# Patient Record
Sex: MALE | Race: WHITE | NOT HISPANIC OR LATINO | Employment: UNEMPLOYED | URBAN - NONMETROPOLITAN AREA
[De-identification: names, ages, dates, MRNs, and addresses within clinical notes are randomized per-mention and may not be internally consistent; named-entity substitution may affect disease eponyms.]

---

## 2017-02-06 ENCOUNTER — TRANSCRIBE ORDERS (OUTPATIENT)
Dept: ADMINISTRATIVE | Facility: HOSPITAL | Age: 67
End: 2017-02-06

## 2017-02-06 DIAGNOSIS — IMO0002 UNCONTROLLED TYPE 2 DIABETES WITH NEUROPATHY: Primary | ICD-10-CM

## 2018-09-13 ENCOUNTER — TRANSCRIBE ORDERS (OUTPATIENT)
Dept: ADMINISTRATIVE | Facility: HOSPITAL | Age: 68
End: 2018-09-13

## 2018-09-13 DIAGNOSIS — M51.15 INTERVERTEBRAL DISC DISORDER WITH RADICULOPATHY OF THORACOLUMBAR REGION: ICD-10-CM

## 2018-09-13 DIAGNOSIS — M51.35 DEGENERATION OF THORACOLUMBAR INTERVERTEBRAL DISC: Primary | ICD-10-CM

## 2018-09-13 DIAGNOSIS — G89.29 CHRONIC RIGHT SHOULDER PAIN: ICD-10-CM

## 2018-09-13 DIAGNOSIS — M25.511 CHRONIC RIGHT SHOULDER PAIN: ICD-10-CM

## 2021-10-05 DIAGNOSIS — M25.512 LEFT SHOULDER PAIN, UNSPECIFIED CHRONICITY: Primary | ICD-10-CM

## 2021-10-25 ENCOUNTER — OFFICE VISIT (OUTPATIENT)
Dept: ORTHOPEDIC SURGERY | Facility: CLINIC | Age: 71
End: 2021-10-25

## 2021-10-25 VITALS
WEIGHT: 315 LBS | BODY MASS INDEX: 49.44 KG/M2 | SYSTOLIC BLOOD PRESSURE: 126 MMHG | HEIGHT: 67 IN | DIASTOLIC BLOOD PRESSURE: 56 MMHG | HEART RATE: 62 BPM

## 2021-10-25 DIAGNOSIS — M19.012 OSTEOARTHRITIS OF GLENOHUMERAL JOINT, LEFT: Primary | ICD-10-CM

## 2021-10-25 PROCEDURE — 20610 DRAIN/INJ JOINT/BURSA W/O US: CPT | Performed by: ORTHOPAEDIC SURGERY

## 2021-10-25 PROCEDURE — 99203 OFFICE O/P NEW LOW 30 MIN: CPT | Performed by: ORTHOPAEDIC SURGERY

## 2021-10-25 RX ORDER — CLONIDINE HYDROCHLORIDE 0.2 MG/1
TABLET ORAL 3 TIMES DAILY
COMMUNITY
Start: 2013-07-03

## 2021-10-25 RX ORDER — HYDROCODONE BITARTRATE AND ACETAMINOPHEN 10; 325 MG/1; MG/1
1 TABLET ORAL EVERY 6 HOURS PRN
COMMUNITY

## 2021-10-25 RX ORDER — LANOLIN ALCOHOL/MO/W.PET/CERES
1000 CREAM (GRAM) TOPICAL DAILY
COMMUNITY

## 2021-10-25 RX ORDER — AZELASTINE HYDROCHLORIDE 137 UG/1
SPRAY, METERED NASAL
COMMUNITY
Start: 2021-10-05

## 2021-10-25 RX ORDER — POTASSIUM CHLORIDE 750 MG/1
10 TABLET, FILM COATED, EXTENDED RELEASE ORAL DAILY
COMMUNITY
Start: 2021-10-05

## 2021-10-25 RX ORDER — ERGOCALCIFEROL 1.25 MG/1
50000 CAPSULE ORAL WEEKLY
COMMUNITY
Start: 2021-10-05

## 2021-10-25 RX ORDER — LISINOPRIL 20 MG/1
1 TABLET ORAL
COMMUNITY
Start: 2013-07-31

## 2021-10-25 RX ORDER — INSULIN DETEMIR 100 [IU]/ML
INJECTION, SOLUTION SUBCUTANEOUS
COMMUNITY
Start: 2021-09-30

## 2021-10-25 RX ORDER — LEVOTHYROXINE SODIUM 0.07 MG/1
TABLET ORAL
COMMUNITY
Start: 2015-01-13

## 2021-10-25 RX ORDER — FOLIC ACID 1 MG/1
1000 TABLET ORAL DAILY
COMMUNITY
Start: 2021-10-05

## 2021-10-25 NOTE — PROGRESS NOTES
New Patient Visit      Patient: Inocencio Del Cid  YOB: 1950  Date of Encounter: 10/25/2021        Chief Complaint:   Chief Complaint   Patient presents with   • Left Shoulder - Pain, Initial Evaluation           HPI:   Inocencio Del Cid, 71 y.o. male, referred by Sobia Youssef APRN presents left shoulder complaints of about 6 months duration no injury recent or remote.  He reports he has difficulty resting because of his shoulder pain and describes limited motion with popping.  Reviewing his medical records he has undergone steroid injections to the AC joints in 2013.  Denies weakness or numbness to either arm.  Pain is currently controlled with hydrocodone 7.5.  Medical history is remarkable for insulin-dependent diabetes.    Active Problem List:  Patient Active Problem List   Diagnosis   • Adult hypothyroidism   • BP (high blood pressure)   • Diabetes mellitus type 2, uncontrolled (HCC)   • HLD (hyperlipidemia)         Past Medical History:  Past Medical History:   Diagnosis Date   • Diabetes (HCC)    • Hypertension    • Thyroid disease          Past Surgical History:  History reviewed. No pertinent surgical history.      Family History:  Family History   Problem Relation Age of Onset   • Diabetes Mother    • Heart disease Father    • Diabetes Brother          Social History:  Social History     Socioeconomic History   • Marital status:    Tobacco Use   • Smoking status: Never Smoker   • Smokeless tobacco: Never Used   Substance and Sexual Activity   • Alcohol use: Never   • Drug use: Never     Body mass index is 56.25 kg/m².      Medications:  Current Outpatient Medications   Medication Sig Dispense Refill   • Azelastine HCl 137 MCG/SPRAY solution INHALE ONE PUFF BY MOUTH TWO TIMES A DAY EVERY DAY     • cloNIDine (CATAPRES) 0.2 MG tablet Take  by mouth 3 (Three) Times a Day.     • folic acid (FOLVITE) 1 MG tablet Take 1,000 mcg by mouth Daily.     • HYDROcodone-acetaminophen (NORCO)  MG  "per tablet Take 1 tablet by mouth Every 6 (Six) Hours As Needed for Moderate Pain .     • Levemir 100 UNIT/ML injection INJECT 90 UNITS SUBCUTANEOUSLY EVERY MORNING AND 30 UNITS EVERY EVENING FOR BLOOD SUGAR     • levothyroxine (SYNTHROID, LEVOTHROID) 75 MCG tablet Take  by mouth.     • lisinopril (PRINIVIL,ZESTRIL) 20 MG tablet Take 1 tablet by mouth.     • NovoLOG 100 UNIT/ML injection INJECT 20 UNITS SUBCUTANEOUSLY THREE TIMES A DAY PRIOR TO MEALS     • potassium chloride 10 MEQ CR tablet Take 10 mEq by mouth Daily. with food     • vitamin B-12 (CYANOCOBALAMIN) 1000 MCG tablet Take 1,000 mcg by mouth Daily.     • vitamin D (ERGOCALCIFEROL) 1.25 MG (89650 UT) capsule capsule Take 50,000 Units by mouth 1 (One) Time Per Week.       No current facility-administered medications for this visit.         Allergies:  No Known Allergies      Review of Systems:   Review of Systems   Constitutional: Negative.    HENT: Negative.    Eyes: Negative.    Respiratory: Negative.    Cardiovascular: Negative.    Gastrointestinal: Negative.    Endocrine: Negative.    Genitourinary: Negative.    Musculoskeletal: Positive for arthralgias and back pain.   Skin: Negative.    Allergic/Immunologic: Negative.    Neurological: Negative.    Hematological: Negative.    Psychiatric/Behavioral: Negative.          Physical Exam:   Physical Exam  GENERAL: 71 y.o. male, alert and oriented X 3 in no acute distress.   Visit Vitals  /56   Pulse 62   Ht 170.2 cm (67.01\")   Wt (!) 163 kg (359 lb 3.2 oz)   BMI 56.25 kg/m²         Musculoskeletal:   Examination left shoulder reveals forward elevation to 60 degrees with 20 degree arc of internal and external rotation.  Minimal tenderness to the AC joint and with crossarm abduction bicipital groove nontender.        Radiology/Labs:     Radiographs reviewed of left shoulder shows advanced osteoarthritis glenohumeral joint with large osteophyte inferior humeral head.  Mild arthritis noted of the left AC " joint.      Assessment & Plan:   71 y.o. male presents left shoulder osteoarthritis by radiographs.  We discussed his options he was then provided intra-articular steroid injection Depo-Medrol 80 mg with lidocaine block left glenohumeral joint he had immediate and near complete relief of symptoms.  He will follow-up in the future as needed.        ICD-10-CM ICD-9-CM   1. Osteoarthritis of glenohumeral joint, left  M19.012 715.91         Large Joint Arthrocentesis: L glenohumeral  Date/Time: 10/27/2021 2:21 PM  Consent given by: patient  Site marked: site marked  Timeout: Immediately prior to procedure a time out was called to verify the correct patient, procedure, equipment, support staff and site/side marked as required   Supporting Documentation  Indications: pain   Procedure Details  Location: shoulder - L glenohumeral  Preparation: Patient was prepped and draped in the usual sterile fashion  Needle size: 25 G  Approach: anterior  Medications administered: 80 mg methylPREDNISolone acetate 80 MG/ML; 5 mL lidocaine PF 1% 1 %  Patient tolerance: patient tolerated the procedure well with no immediate complications            Cc:   Sobia Youssef APRN                This document has been electronically signed by Pacheco Shaw MD   October 27, 2021 14:20 EDT

## 2021-10-27 PROBLEM — E03.9 ADULT HYPOTHYROIDISM: Status: ACTIVE | Noted: 2021-10-27

## 2021-10-27 PROBLEM — I10 BP (HIGH BLOOD PRESSURE): Status: ACTIVE | Noted: 2021-10-27

## 2021-10-27 PROBLEM — IMO0002 DIABETES MELLITUS TYPE 2, UNCONTROLLED: Status: ACTIVE | Noted: 2021-10-27

## 2021-10-27 PROBLEM — E78.5 HLD (HYPERLIPIDEMIA): Status: ACTIVE | Noted: 2021-10-27

## 2021-10-27 RX ADMIN — LIDOCAINE HYDROCHLORIDE 5 ML: 10 INJECTION, SOLUTION EPIDURAL; INFILTRATION; INTRACAUDAL; PERINEURAL at 14:21

## 2021-10-27 RX ADMIN — METHYLPREDNISOLONE ACETATE 80 MG: 80 INJECTION, SUSPENSION INTRA-ARTICULAR; INTRALESIONAL; INTRAMUSCULAR; SOFT TISSUE at 14:21

## 2021-10-29 RX ORDER — LIDOCAINE HYDROCHLORIDE 10 MG/ML
5 INJECTION, SOLUTION EPIDURAL; INFILTRATION; INTRACAUDAL; PERINEURAL
Status: COMPLETED | OUTPATIENT
Start: 2021-10-27 | End: 2021-10-27

## 2021-10-29 RX ORDER — METHYLPREDNISOLONE ACETATE 80 MG/ML
80 INJECTION, SUSPENSION INTRA-ARTICULAR; INTRALESIONAL; INTRAMUSCULAR; SOFT TISSUE
Status: COMPLETED | OUTPATIENT
Start: 2021-10-27 | End: 2021-10-27